# Patient Record
Sex: MALE | Race: WHITE | NOT HISPANIC OR LATINO | Employment: OTHER | URBAN - METROPOLITAN AREA
[De-identification: names, ages, dates, MRNs, and addresses within clinical notes are randomized per-mention and may not be internally consistent; named-entity substitution may affect disease eponyms.]

---

## 2023-03-16 NOTE — PROGRESS NOTES
Neurosurgical Consultation      Benjamin Wynne is a 77 y.o. male is being seen for consultation today at the request of Sofia Santana,* for neck and back pain. His back and right leg pain is worse.     Chief Complaint   Patient presents with   • Back Pain   • Leg Pain        Previous treatment:    HPI: This is a 77-year-old gentleman who presents to the neurosurgery clinic for evaluation of focal left paraspinal/PSIS pain.  He feels as though he has had this pain for approximately 2 years.  It has worsened.  Sitting worsens the pain.  He gets improvement in his back pain with walking or standing.  He does get pain into his right anterior shin.  This is worsened with walking or standing too long.  He also has focal neck pain for approximately 6 months which she admits is relatively minor at this juncture.  He does not have any bowel or bladder symptoms.  He does not have any history concerning for cervical myelopathy.  He has attempted Tylenol and gabapentin for his pain.  He has not had any physical therapy for his low back.    Of note he has a history of diabetes with a hemoglobin A1c of 6.6%.  He has a history of myocardial infarction x4 as well as prior multivessel CABG and coronary artery stenting.  He did have a cerebrovascular accident after one of the coronary stents.  He is on daily aspirin.  He was an every day smoker for almost 50 years at 3-1/2 packs/day.    Past Medical History:   Diagnosis Date   • Anemia    • CAD (coronary artery disease)    • Cervicalgia    • Elevated C-reactive protein (CRP)    • Hyperlipidemia    • Hyperparathyroidism (HCC)    • Keratoderma    • Osteoarthritis    • Peripheral neuropathy    • Trigger finger    • Type 2 diabetes mellitus (HCC)         Past Surgical History:   Procedure Laterality Date   • CARDIAC SURGERY     • KNEE SURGERY          Current Outpatient Medications on File Prior to Visit   Medication Sig Dispense Refill   • Aspirin 81 MG capsule Daily.      • atorvastatin (LIPITOR) 80 MG tablet Daily.     • cyanocobalamin (VITAMIN B-12) 500 MCG tablet cyanocobalamin (vit B-12) 500 mcg tablet   Take by oral route.     • ferrous sulfate 325 (65 FE) MG tablet Daily.     • gabapentin (NEURONTIN) 300 MG capsule Every 8 (Eight) Hours.     • glucose-vitamin C 4-6 GM-MG chewable tablet chewable tablet glucose 4 gram chewable tablet   Take by oral route.     • Insulin Aspart (novoLOG) 100 UNIT/ML injection insulin aspart (niacinamide) (U-100) 100 unit/mL subcutaneous solution   Inject by subcutaneous route.     • insulin detemir (LEVEMIR) 100 UNIT/ML injection insulin detemir (U-100) 100 unit/mL (3 mL) subcutaneous pen   Inject by subcutaneous route.     • isosorbide mononitrate (IMDUR) 30 MG 24 hr tablet Daily.     • Omega-3 Fatty Acids (fish oil) 1000 MG capsule capsule Fish Oil       No current facility-administered medications on file prior to visit.        No Known Allergies     Social History     Socioeconomic History   • Marital status:    Tobacco Use   • Smoking status: Former     Types: Cigarettes     Quit date:      Years since quittin.2   • Smokeless tobacco: Never   Vaping Use   • Vaping Use: Never used   Substance and Sexual Activity   • Alcohol use: Not Currently   • Drug use: Never   • Sexual activity: Defer          Review of Systems   Constitutional: Positive for activity change.   HENT: Negative.    Eyes: Negative.    Respiratory: Negative for chest tightness and shortness of breath.    Cardiovascular: Negative for chest pain.   Gastrointestinal: Negative.    Endocrine: Negative.    Genitourinary: Negative.    Musculoskeletal: Positive for arthralgias (Left hip pain), back pain, gait problem, myalgias and neck pain.        Right leg pain   Skin: Negative.    Allergic/Immunologic: Negative.    Neurological: Positive for numbness.        Positive for tingling   Psychiatric/Behavioral: Positive for sleep disturbance.        Physical  "Examination:     Vitals:    03/17/23 1152   BP: 140/65   Pulse: 55   Resp: 16   Temp: 98.2 °F (36.8 °C)   SpO2: 98%   Weight: 88.5 kg (195 lb)   Height: 175.3 cm (69\")        Physical Exam  Eyes:      General: Lids are normal.      Extraocular Movements: Extraocular movements intact.      Pupils: Pupils are equal, round, and reactive to light.   Neurological:      Motor: Motor strength is normal.      Deep Tendon Reflexes:      Reflex Scores:       Patellar reflexes are 0 on the right side and 0 on the left side.       Achilles reflexes are 0 on the right side and 0 on the left side.  Psychiatric:         Speech: Speech normal.          Neurological Exam  Mental Status  Awake, alert and oriented to person, place and time. Speech is normal. Language is fluent with no aphasia.    Cranial Nerves  CN II: Visual fields full to confrontation.  CN III, IV, VI: Extraocular movements intact bilaterally. Normal lids and orbits bilaterally. Pupils equal round and reactive to light bilaterally.  CN V: Facial sensation is normal.  CN VII: Full and symmetric facial movement.  CN IX, X: Palate elevates symmetrically. Normal gag reflex.  CN XI: Shoulder shrug strength is normal.  CN XII: Tongue midline without atrophy or fasciculations.    Motor  Normal muscle bulk throughout. No fasciculations present. Strength is 5/5 throughout all four extremities.    Sensory  Right: Loss of sensation in the L4 and L5 dermatome.    Reflexes                                            Right                      Left  Patellar                                0                         0  Achilles                                0                         0    Right pathological reflexes: Johanna's absent.  Left pathological reflexes: Johanna's absent.    Coordination  Right: Finger-to-nose normal.Left: Finger-to-nose normal.     Positive straight leg raise on the right.  Negative SI joint evocative maneuvers.    Result Review  The following data was " reviewed by: Reinaldo José MD on 03/17/2023:    Data reviewed: Radiologic studies MRI of the lumbar spine shows multilevel spondylosis with disc degeneration and multiple spots of stenosis.  At the L2-3 level there is right-sided eccentric stenosis and foraminal stenosis.  At the L3-L4 level on L4-L5 level there is central canal stenosis.  At the L5-S1 level there is right sided lateral recess stenosis.  There does appear to be foraminal stenosis affecting the L4 and L5 nerve roots.     Assessment/plan:  This is a 77-year-old gentleman with focal back pain and possible right sided L4 or L5 radiculopathy.  He does also complain of neck pain without indication of cervical radiculopathy or cervical myelopathy.  I do recommend a course of physical therapy for his focal back pain and neck pain.  I will order him a trial of meloxicam with hopes that this improves some of the arthritic pain I believe is contributing to his symptoms.  He will return to see me in approximately 2 months after completion of physical therapy.  I have encouraged him to call with any questions or concerns.    Diagnoses and all orders for this visit:    1. Neck pain (Primary)  -     Ambulatory Referral to Physical Therapy Evaluate and treat    2. Chronic left-sided back pain, unspecified back location  -     Ambulatory Referral to Physical Therapy Evaluate and treat    Other orders  -     meloxicam (MOBIC) 15 MG tablet; Take 1 tablet by mouth Daily.  Dispense: 31 tablet; Refill: 0         Return in about 2 months (around 5/17/2023).            Reinaldo José MD

## 2023-03-17 ENCOUNTER — OFFICE VISIT (OUTPATIENT)
Dept: NEUROSURGERY | Facility: CLINIC | Age: 78
End: 2023-03-17
Payer: OTHER GOVERNMENT

## 2023-03-17 VITALS
TEMPERATURE: 98.2 F | OXYGEN SATURATION: 98 % | RESPIRATION RATE: 16 BRPM | HEART RATE: 55 BPM | DIASTOLIC BLOOD PRESSURE: 65 MMHG | SYSTOLIC BLOOD PRESSURE: 140 MMHG | BODY MASS INDEX: 28.88 KG/M2 | WEIGHT: 195 LBS | HEIGHT: 69 IN

## 2023-03-17 DIAGNOSIS — M54.2 NECK PAIN: Primary | ICD-10-CM

## 2023-03-17 DIAGNOSIS — M54.9 CHRONIC LEFT-SIDED BACK PAIN, UNSPECIFIED BACK LOCATION: ICD-10-CM

## 2023-03-17 DIAGNOSIS — G89.29 CHRONIC LEFT-SIDED BACK PAIN, UNSPECIFIED BACK LOCATION: ICD-10-CM

## 2023-03-17 PROCEDURE — 99204 OFFICE O/P NEW MOD 45 MIN: CPT | Performed by: NEUROLOGICAL SURGERY

## 2023-03-17 RX ORDER — MELOXICAM 15 MG/1
15 TABLET ORAL DAILY
Qty: 31 TABLET | Refills: 0 | Status: SHIPPED | OUTPATIENT
Start: 2023-03-17

## 2023-03-17 RX ORDER — ISOSORBIDE MONONITRATE 30 MG/1
TABLET, EXTENDED RELEASE ORAL EVERY 24 HOURS
COMMUNITY

## 2023-03-17 RX ORDER — FERROUS SULFATE 325(65) MG
TABLET ORAL EVERY 24 HOURS
COMMUNITY

## 2023-03-17 RX ORDER — INSULIN ASPART 100 [IU]/ML
INJECTION, SOLUTION INTRAVENOUS; SUBCUTANEOUS
COMMUNITY

## 2023-03-17 RX ORDER — CHLORAL HYDRATE 500 MG
CAPSULE ORAL
COMMUNITY

## 2023-03-17 RX ORDER — ATORVASTATIN CALCIUM 80 MG/1
TABLET, FILM COATED ORAL EVERY 24 HOURS
COMMUNITY

## 2023-03-17 RX ORDER — GABAPENTIN 300 MG/1
CAPSULE ORAL EVERY 8 HOURS SCHEDULED
COMMUNITY

## 2023-08-17 ENCOUNTER — TELEPHONE (OUTPATIENT)
Dept: NEUROSURGERY | Facility: CLINIC | Age: 78
End: 2023-08-17
Payer: OTHER GOVERNMENT

## 2023-08-17 NOTE — PROGRESS NOTES
Neurosurgical Consultation      Benjamin Wynne is a 77 y.o. male is here today for follow-up for neck pain. In the office today patient reports of neck pain that radiates down bilateral arms.     Chief Complaint   Patient presents with    Neck Pain     Follow up        Previous treatment: Meloxicam 15mg, Gabapentin 300mg    HPI: This is a 77-year-old gentleman who I last evaluated on March 17, 2023.  At my last evaluation I recommended a course of physical therapy.  He attempted physical therapy in the past and could not tolerate it.  He was unable to engage with any physical therapy.  He today describes neck back and leg pain.  He does not describe any cervical myelopathy-like symptoms.  He does not describe any cervical radiculopathy-like symptoms.  He does describe some component of potentially neurogenic claudication however this may also be shadowed by some of his diabetic neuropathy.  He does not have any new red flag signs including absence of bowel or bladder symptoms.  He is on gabapentin 300 mg 3 times a day as well as a nonsteroidal anti-inflammatory.    Past Medical History:   Diagnosis Date    Anemia     CAD (coronary artery disease)     Cervicalgia     Elevated C-reactive protein (CRP)     Hyperlipidemia     Hyperparathyroidism     Keratoderma     Osteoarthritis     Peripheral neuropathy     Trigger finger     Type 2 diabetes mellitus         Past Surgical History:   Procedure Laterality Date    CARDIAC SURGERY      KNEE SURGERY          Current Outpatient Medications on File Prior to Visit   Medication Sig Dispense Refill    Aspirin 81 MG capsule Daily.      atorvastatin (LIPITOR) 80 MG tablet Daily.      cyanocobalamin (VITAMIN B-12) 500 MCG tablet cyanocobalamin (vit B-12) 500 mcg tablet   Take by oral route.      Ferrous Fumarate 325 (106 Fe) MG tablet 325 mg.      ferrous sulfate 325 (65 FE) MG tablet Daily.      gabapentin (NEURONTIN) 300 MG capsule Every 8 (Eight) Hours.      gabapentin  "(NEURONTIN) 300 MG capsule Take 1 capsule by mouth 3 (Three) Times a Day.      glucose-vitamin C 4-6 GM-MG chewable tablet chewable tablet glucose 4 gram chewable tablet   Take by oral route.      Insulin Aspart (novoLOG) 100 UNIT/ML injection insulin aspart (niacinamide) (U-100) 100 unit/mL subcutaneous solution   Inject by subcutaneous route.      insulin detemir (LEVEMIR) 100 UNIT/ML injection insulin detemir (U-100) 100 unit/mL (3 mL) subcutaneous pen   Inject by subcutaneous route.      isosorbide mononitrate (IMDUR) 30 MG 24 hr tablet Daily.      meloxicam (MOBIC) 15 MG tablet Take 1 tablet by mouth Daily. 31 tablet 0    Omega-3 Fatty Acids (fish oil) 1000 MG capsule capsule Fish Oil       No current facility-administered medications on file prior to visit.        No Known Allergies     Social History     Socioeconomic History    Marital status:    Tobacco Use    Smoking status: Former     Types: Cigarettes     Quit date:      Years since quittin.6    Smokeless tobacco: Never   Vaping Use    Vaping Use: Never used   Substance and Sexual Activity    Alcohol use: Not Currently    Drug use: Never    Sexual activity: Defer          Review of Systems   Constitutional:  Positive for activity change.   HENT: Negative.     Eyes: Negative.    Respiratory: Negative.     Cardiovascular: Negative.    Gastrointestinal: Negative.    Endocrine: Negative.    Genitourinary: Negative.    Musculoskeletal:  Positive for myalgias and neck pain.   Skin: Negative.    Allergic/Immunologic: Negative.    Neurological:  Positive for weakness (Bilateral arms) and numbness.   Hematological: Negative.    Psychiatric/Behavioral:  Positive for sleep disturbance.       Physical Examination:     Vitals:    23 1025   BP: 134/73   BP Location: Left arm   Patient Position: Sitting   Cuff Size: Adult   Pulse: (!) 41   Resp: 18   Weight: 87 kg (191 lb 12.8 oz)   Height: 175.3 cm (69\")   PainSc:   6   PainLoc: Neck    "     Physical Exam        Vitals:    08/18/23 1025   PainSc:   6   PainLoc: Neck            Neurological Exam   Neurological examination appears stable compared to my last evaluation.  There is no indication of upper motor neuron dysfunction.    Result Review  The following data was reviewed by: Reinaldo José MD on 08/18/2023:    Data reviewed : Radiologic studies MRI of the cervical spine shows significant stenosis at C5-C6 as well as moderate to severe stenosis at C6/C7.  Lumbar MRI shows multilevel spondylosis with multilevel spinal canal, lateral recess and foraminal stenosis.      Assessment/plan:  This is a 77-year-old gentleman with significant health history including cardiac issues and diabetes who has diabetic polyneuropathy.  He has neck pain as well as leg pain.  His leg pain could resemble neurogenic claudication.  He is unable to complete physical therapy.  I recommend referral to pain management for possible medication management as well as possible interventions.  If he does obtain significant relief with a lumbar steroid epidural injection he may benefit from surgical decompression.  If no interventions provide profound relief consideration of spinal cord stimulation for diabetic polyneuropathy is reasonable.  He will return to see me in approximately 3 months.  I have encouraged him to call with any questions or concerns.    Diagnoses and all orders for this visit:    1. Neck pain (Primary)  -     Ambulatory Referral to Pain Management Clinic    2. Spinal stenosis, lumbar region, with neurogenic claudication  -     Ambulatory Referral to Pain Management Clinic         Return in about 3 months (around 11/18/2023).            Reinaldo José MD

## 2023-08-17 NOTE — TELEPHONE ENCOUNTER
Called patient today to verify completion of PT. Patient stated when he done PT last year that it did not help and was difficult for him. I informed him that Dr. José will gladly see him but he will still recommend the PT. He verbalized understanding and would still like to see Dr. José.

## 2023-08-18 ENCOUNTER — OFFICE VISIT (OUTPATIENT)
Dept: NEUROSURGERY | Facility: CLINIC | Age: 78
End: 2023-08-18
Payer: OTHER GOVERNMENT

## 2023-08-18 VITALS
BODY MASS INDEX: 28.41 KG/M2 | SYSTOLIC BLOOD PRESSURE: 134 MMHG | HEART RATE: 41 BPM | DIASTOLIC BLOOD PRESSURE: 73 MMHG | HEIGHT: 69 IN | WEIGHT: 191.8 LBS | RESPIRATION RATE: 18 BRPM

## 2023-08-18 DIAGNOSIS — M54.2 NECK PAIN: Primary | ICD-10-CM

## 2023-08-18 DIAGNOSIS — M48.062 SPINAL STENOSIS, LUMBAR REGION, WITH NEUROGENIC CLAUDICATION: ICD-10-CM

## 2023-08-18 RX ORDER — GABAPENTIN 300 MG/1
300 CAPSULE ORAL 3 TIMES DAILY
COMMUNITY
Start: 2023-03-22

## 2023-09-06 ENCOUNTER — TELEPHONE (OUTPATIENT)
Dept: NEUROSURGERY | Facility: CLINIC | Age: 78
End: 2023-09-06

## 2023-11-16 NOTE — PROGRESS NOTES
Neurosurgical Consultation      Benjamin Wynne is a 78 y.o. male is here today for follow-up on neck pain after injections with [facility]. Today patient reports 75% improvement with his neck pain since receiving the injections.    Chief Complaint   Patient presents with    Neck Pain     Follow up         Previous treatment: Cervical injections at [facility]    HPI: This is an 78-year-old gentleman who I last evaluated on August 18, 2023.  He has chronic neck pain without clear myelopathic symptoms or radiculopathy.  He has chronic back pain with potentially some neurogenic claudication.  However he has clear diabetic polyneuropathy.  At my last evaluation I recommended a pain management evaluation for possible lumbar epidural steroid injection.  He completed this injection.  He felt some overall improvement for which she has difficulty placing a percentage on.  Upon discussion with him about the extent that his symptoms affect his quality of life he feels as though his symptoms do not profoundly affect his quality of life.  He expresses limited interest in exploring further treatment of his pains.    Past Medical History:   Diagnosis Date    Anemia     CAD (coronary artery disease)     Cervicalgia     Elevated C-reactive protein (CRP)     Hyperlipidemia     Hyperparathyroidism     Keratoderma     Osteoarthritis     Peripheral neuropathy     Trigger finger     Type 2 diabetes mellitus         Past Surgical History:   Procedure Laterality Date    CARDIAC SURGERY      KNEE SURGERY          Current Outpatient Medications on File Prior to Visit   Medication Sig Dispense Refill    Aspirin 81 MG capsule Daily.      atorvastatin (LIPITOR) 80 MG tablet Daily.      cyanocobalamin (VITAMIN B-12) 500 MCG tablet cyanocobalamin (vit B-12) 500 mcg tablet   Take by oral route.      Ferrous Fumarate 325 (106 Fe) MG tablet 325 mg.      ferrous sulfate 325 (65 FE) MG tablet Daily.      gabapentin (NEURONTIN) 300 MG  Writer called back but no answer so left voice mail for patient to return phone call.  Advised pt that it is best to take meds as prescribed but that she can stay off of this if she has been off of it for months.   "capsule Every 8 (Eight) Hours.      gabapentin (NEURONTIN) 300 MG capsule Take 1 capsule by mouth 3 (Three) Times a Day.      glucose-vitamin C 4-6 GM-MG chewable tablet chewable tablet glucose 4 gram chewable tablet   Take by oral route.      Insulin Aspart (novoLOG) 100 UNIT/ML injection insulin aspart (niacinamide) (U-100) 100 unit/mL subcutaneous solution   Inject by subcutaneous route.      insulin detemir (LEVEMIR) 100 UNIT/ML injection insulin detemir (U-100) 100 unit/mL (3 mL) subcutaneous pen   Inject by subcutaneous route.      isosorbide mononitrate (IMDUR) 30 MG 24 hr tablet Daily.      meloxicam (MOBIC) 15 MG tablet Take 1 tablet by mouth Daily. 31 tablet 0    Omega-3 Fatty Acids (fish oil) 1000 MG capsule capsule Fish Oil       No current facility-administered medications on file prior to visit.        No Known Allergies     Social History     Socioeconomic History    Marital status:    Tobacco Use    Smoking status: Former     Types: Cigarettes     Quit date:      Years since quittin.8    Smokeless tobacco: Never   Vaping Use    Vaping Use: Never used   Substance and Sexual Activity    Alcohol use: Not Currently    Drug use: Never    Sexual activity: Defer          Review of Systems   Constitutional:  Positive for activity change.   HENT: Negative.     Eyes: Negative.    Gastrointestinal: Negative.    Endocrine: Negative.    Genitourinary: Negative.    Musculoskeletal:  Positive for arthralgias, myalgias, neck pain and neck stiffness.   Skin: Negative.    Allergic/Immunologic: Negative.    Neurological:  Positive for numbness (tingling/bilateral hands).   Hematological: Negative.    Psychiatric/Behavioral:  Positive for sleep disturbance.         Physical Examination:     Vitals:    23 1033   BP: 132/42   Pulse: 73   Weight: 85.1 kg (187 lb 9.6 oz)   Height: 175.3 cm (69\")   PainSc:   4        Physical Exam     Neurological Exam   Neurological examination is stable compared to my " last evaluation without any new red flag signs.    Result Review  The following data was reviewed by: Reinaldo José MD on 11/21/2023:    Data reviewed : Radiologic studies MRI of the cervical spine shows stenosis at C5-C6 as well as C6/C7.  Lumbar MRI shows multilevel spondylosis with multilevel spinal canal and lateral recess as well as foraminal stenosis.    Assessment/plan:  This is an 78-year-old gentleman with chronic back and neck pain.  He may have some component of neurogenic claudication.  He did get some relief with an epidural steroid injection.  He does not feel as though his symptoms severely affect his ability to have a high quality of life.  He is not overly interested in pursuing any further treatment.  Believe this is a reasonable decision.  He can follow-up with me in the future if symptoms were to progress and affect his quality of life.  Otherwise I encouraged him to call with any questions or concerns.      Diagnoses and all orders for this visit:    1. Spinal stenosis, lumbar region, with neurogenic claudication (Primary)    2. Neck pain         Return if symptoms worsen or fail to improve.            Reinaldo José MD

## 2023-11-21 ENCOUNTER — OFFICE VISIT (OUTPATIENT)
Dept: NEUROSURGERY | Facility: CLINIC | Age: 78
End: 2023-11-21
Payer: OTHER GOVERNMENT

## 2023-11-21 VITALS
HEART RATE: 73 BPM | HEIGHT: 69 IN | DIASTOLIC BLOOD PRESSURE: 42 MMHG | SYSTOLIC BLOOD PRESSURE: 132 MMHG | BODY MASS INDEX: 27.78 KG/M2 | WEIGHT: 187.6 LBS

## 2023-11-21 DIAGNOSIS — M54.2 NECK PAIN: ICD-10-CM

## 2023-11-21 DIAGNOSIS — M48.062 SPINAL STENOSIS, LUMBAR REGION, WITH NEUROGENIC CLAUDICATION: Primary | ICD-10-CM

## 2023-11-21 PROCEDURE — 99214 OFFICE O/P EST MOD 30 MIN: CPT | Performed by: NEUROLOGICAL SURGERY
